# Patient Record
Sex: MALE
[De-identification: names, ages, dates, MRNs, and addresses within clinical notes are randomized per-mention and may not be internally consistent; named-entity substitution may affect disease eponyms.]

---

## 2020-10-28 PROBLEM — Z00.00 ENCOUNTER FOR PREVENTIVE HEALTH EXAMINATION: Status: ACTIVE | Noted: 2020-10-28

## 2020-11-05 ENCOUNTER — APPOINTMENT (OUTPATIENT)
Dept: HEART AND VASCULAR | Facility: CLINIC | Age: 60
End: 2020-11-05
Payer: COMMERCIAL

## 2020-11-05 DIAGNOSIS — R79.89 OTHER SPECIFIED ABNORMAL FINDINGS OF BLOOD CHEMISTRY: ICD-10-CM

## 2020-11-05 DIAGNOSIS — I34.0 NONRHEUMATIC MITRAL (VALVE) INSUFFICIENCY: ICD-10-CM

## 2020-11-05 DIAGNOSIS — Z82.49 FAMILY HISTORY OF ISCHEMIC HEART DISEASE AND OTHER DISEASES OF THE CIRCULATORY SYSTEM: ICD-10-CM

## 2020-11-05 DIAGNOSIS — I48.0 PAROXYSMAL ATRIAL FIBRILLATION: ICD-10-CM

## 2020-11-05 DIAGNOSIS — I42.9 CARDIOMYOPATHY, UNSPECIFIED: ICD-10-CM

## 2020-11-05 DIAGNOSIS — I07.1 RHEUMATIC TRICUSPID INSUFFICIENCY: ICD-10-CM

## 2020-11-05 PROCEDURE — 99205 OFFICE O/P NEW HI 60 MIN: CPT | Mod: 95

## 2020-11-05 RX ORDER — APIXABAN 5 MG/1
5 TABLET, FILM COATED ORAL
Refills: 0 | Status: ACTIVE | COMMUNITY

## 2020-11-05 RX ORDER — PROPAFENONE HYDROCHLORIDE 225 MG/1
TABLET, FILM COATED ORAL
Refills: 0 | Status: ACTIVE | COMMUNITY

## 2020-11-05 NOTE — DISCUSSION/SUMMARY
[Paroxysmal Atrial Fibrillation] : paroxysmal atrial fibrillation [Stable] : stable [Moderate Mitral Regurgitation] : moderate mitral regurgitation [Compensated] : compensated [None] : none [Patient] : the patient [de-identified] : low nl and or global mild lv sys dysfxn [de-identified] : consider ACE inh [de-identified] : consider ACE inh [FreeTextEntry1] : TR - mod

## 2020-11-05 NOTE — REASON FOR VISIT
[Initial Evaluation] : an initial evaluation of [Atrial Fibrillation] : atrial fibrillation [Cardiomyopathy] : cardiomyopathy [Mitral Regurgitation] : mitral regurgitation [FreeTextEntry1] : TR

## 2020-11-05 NOTE — HISTORY OF PRESENT ILLNESS
[FreeTextEntry1] : Jeremias Watson is a 61 yo male who requests a televideo CV evaluation.  Consent was obtained and recorded. He is at his home and doctor is at [address] [place] [address].\par \par After a few weeks of spiritual isolation in Jan/Feb 2020, he underwent a routine annual evaluation with his primary care MD.  ECG done at the time revealed his was in AFIB.  He was asymptomatic.  CV referral was made but did not take place due to COVID 19 pandemic.  \par \par In June 2020, he developed exertional fatigue and postural hypotension.  He did undergo CV evaluation at that time and was started on metoprolol and Eliquis after having TTE and Holter revealing AFIB and global hypokinesis (LVEF 50%), LAE, and mod TR/MR.  CTA was also done and revealed no sig CAD.  \par \par He had recurrence of AFIB and his med regimen was changed to Sotalol.  He had recurrent AFIB and underwent DCCV in 9/2020.  He was in SR for 23 days and he started to taper sotalol.  He had recurrent AFIB (10/2/2020) and was started on digoxin and reinitiated sotalol.  He was intolerant of sotalol.  On 10/15/2020, he was changed to propafenone.  He has been in SR for approx 2-3 weeks at this time.\par \par Today, he denies cp, sob, pnd, orthopnea, edema, palp, or loc.\par \par He is active and compliant with meds.\par \par Reviewed his clinical hx in detail.\par \par Recommendations:\par 1. consider EXSE to assess MR/TR and PA pressure post exercise\par 2. consider CPET \par 3. consider EP recommendations for ablation\par 4. continue current CV meds\par 5. will follow up with him once additional eval takes place in [place]

## 2020-11-12 ENCOUNTER — APPOINTMENT (OUTPATIENT)
Dept: HEART AND VASCULAR | Facility: CLINIC | Age: 60
End: 2020-11-12